# Patient Record
Sex: MALE | Race: WHITE | NOT HISPANIC OR LATINO
[De-identification: names, ages, dates, MRNs, and addresses within clinical notes are randomized per-mention and may not be internally consistent; named-entity substitution may affect disease eponyms.]

---

## 2019-01-08 PROBLEM — Z00.00 ENCOUNTER FOR PREVENTIVE HEALTH EXAMINATION: Status: ACTIVE | Noted: 2019-01-08

## 2019-01-14 ENCOUNTER — RECORD ABSTRACTING (OUTPATIENT)
Age: 54
End: 2019-01-14

## 2019-01-14 DIAGNOSIS — Z78.9 OTHER SPECIFIED HEALTH STATUS: ICD-10-CM

## 2019-01-14 DIAGNOSIS — A09 INFECTIOUS GASTROENTERITIS AND COLITIS, UNSPECIFIED: ICD-10-CM

## 2019-01-14 DIAGNOSIS — Z82.3 FAMILY HISTORY OF STROKE: ICD-10-CM

## 2019-02-01 ENCOUNTER — APPOINTMENT (OUTPATIENT)
Dept: GASTROENTEROLOGY | Facility: CLINIC | Age: 54
End: 2019-02-01
Payer: COMMERCIAL

## 2019-02-01 VITALS
WEIGHT: 170 LBS | HEIGHT: 67 IN | BODY MASS INDEX: 26.68 KG/M2 | DIASTOLIC BLOOD PRESSURE: 90 MMHG | HEART RATE: 82 BPM | SYSTOLIC BLOOD PRESSURE: 136 MMHG

## 2019-02-01 PROCEDURE — 99243 OFF/OP CNSLTJ NEW/EST LOW 30: CPT

## 2019-02-01 RX ORDER — CEPHALEXIN 500 MG/1
500 CAPSULE ORAL
Refills: 0 | Status: DISCONTINUED | COMMUNITY
End: 2019-02-01

## 2019-02-01 NOTE — ASSESSMENT
[FreeTextEntry1] : -Hx colon polyps - Colonoscopy scheduled - Risks, benefits, alternatives were discussed, including but not limited to bleeding, infection, perforation and sedation risks. Additionally, the possibility of missed lesions was conveyed.\par \par -GERD - Reviewed dietary and lifestyle modifications, including weight loss, smaller/frequent/earlier (>3h prior to lying down) meals, trials of cutting down/out caffeine, chocolate, tomatoes, fatty foods, alcohol.\par \par -LFTs - will request labs from last 2 annuals - if LFTs abnormal, will w/u , otherwise would consider ALT mild elevation at ER visit due to acute colitis epiphenomenon

## 2019-02-01 NOTE — HISTORY OF PRESENT ILLNESS
[FreeTextEntry1] : 53m  h/o nephrolithiasis, GERD for f/u\par \par Hx polyps - \par Pt denies abdominal pain, rectal bleeding, change in bowel habits, or unexplained weight loss.  \par Some BRBPR reported at wiping after hard stools.\par \par GERD - not bothersome now - diet controlled (avoids coffee, chocolate, late eating).  No dysphagia/wt loss.\par \par Last colonoscopy: 3/2014 cecal polyp\par Last EGD 3/2014 mild esophagitis (A), mild h. hernia\par \par Other hx:\par ER for colitis 2/8/14 turning bloody diarrhea with lower abdominal cramps. \par cbc, cmet unremarkable (except ALT 90s), but \par CT +IV: IMPRESSION: There is apparent wall thickening of the descending colon suspicious for a colitis. Inflammatory and/or infectious etiologies should be considered. Ischemia is unlikely in this patient without signs of atherosclerotic disease of the aorta or its branches.  There is hepatic steatosis.\par \par I had rec f/u after last visit to w/u ALT - pt reports not having an significant abnormality on routine testing since.  \par \par Soc:  no tobacco or significant EtOH\par FHx: GF w/ CRC\par \par ROS:\par Constitutional:: no weight loss, fevers\par ENT: no deafness\par Eyes: not blind\par Neck: no LN\par Chest: no dyspnea/cough\par Cardiac: no chest pain\par Vascular: no leg swelling\par GI: no abdominal pain, nausea, vomiting, diarrhea, constipation, rectal bleeding, dysphagia, melena unless otherwise noted in HPI\par : no dysuria, dark urine\par Skin: no rashes, jaundice\par Heme: no bleeding\par \par Px: (VS noted below)\par General: NAD\par Eyes: anicteric\par Oropharynx:  clear\par Neck: no LN\par Chest: normal respiratory effort\par CVS: regular\par Abd: soft, NT, ND, +BS, no HSM\par Ext: no atrophy\par Neuro: grossly nonfocal\par \par \par

## 2019-02-01 NOTE — CONSULT LETTER
[Dear  ___] : Dear  [unfilled], [Consult Letter:] : I had the pleasure of evaluating your patient, [unfilled]. [Please see my note below.] : Please see my note below. [Consult Closing:] : Thank you very much for allowing me to participate in the care of this patient.  If you have any questions, please do not hesitate to contact me. [Sincerely,] : Sincerely, [FreeTextEntry3] : Kush Cristina MD\par

## 2019-02-07 ENCOUNTER — APPOINTMENT (OUTPATIENT)
Dept: PODIATRY | Facility: CLINIC | Age: 54
End: 2019-02-07
Payer: COMMERCIAL

## 2019-02-07 VITALS
DIASTOLIC BLOOD PRESSURE: 80 MMHG | SYSTOLIC BLOOD PRESSURE: 120 MMHG | WEIGHT: 170 LBS | HEIGHT: 66 IN | BODY MASS INDEX: 27.32 KG/M2

## 2019-02-07 PROCEDURE — 11750 EXCISION NAIL&NAIL MATRIX: CPT | Mod: LT

## 2019-02-07 PROCEDURE — 99213 OFFICE O/P EST LOW 20 MIN: CPT | Mod: 25

## 2019-02-07 RX ORDER — LIDOCAINE HYDROCHLORIDE AND EPINEPHRINE BITARTRATE 20; .01 MG/ML; MG/ML
2 INJECTION, SOLUTION SUBCUTANEOUS
Qty: 0 | Refills: 0 | Status: COMPLETED | OUTPATIENT
Start: 2019-02-07

## 2019-02-07 RX ORDER — LIDOCAINE HYDROCHLORIDE 10 MG/ML
1 INJECTION, SOLUTION INFILTRATION; PERINEURAL
Qty: 0 | Refills: 0 | Status: COMPLETED | OUTPATIENT
Start: 2019-02-07

## 2019-02-07 RX ORDER — LIDOCAINE HYDROCHLORIDE 20 MG/ML
2 INJECTION, SOLUTION INFILTRATION; PERINEURAL
Qty: 0 | Refills: 0 | Status: COMPLETED | OUTPATIENT
Start: 2019-02-07

## 2019-02-07 RX ADMIN — LIDOCAINE HYDROCHLORIDE AND EPINEPHRINE BITARTRATE %-1:100000: 20; .01 INJECTION, SOLUTION SUBCUTANEOUS at 00:00

## 2019-02-07 RX ADMIN — LIDOCAINE HYDROCHLORIDE AND EPINEPHRINE %-1:100000: 10; 10 INJECTION, SOLUTION INFILTRATION; PERINEURAL at 00:00

## 2019-02-07 RX ADMIN — LIDOCAINE HYDROCHLORIDE %: 10 INJECTION, SOLUTION INFILTRATION; PERINEURAL at 00:00

## 2019-02-07 NOTE — PROCEDURE
[FreeTextEntry1] : I had a lengthy discussion with the patient from a medical assistant regarding treatment options for ingrown nails. They ranged from conservative to surgical. Conservative measures included simple nail care on a periodic basis with removal of all offending spicule and periodic care every time if and when the problem recurs. I also explained a nonpermanent removal in which Novocain would be used a portion of the old offending portion of nail would be removed and allowed to grow back in and monitor the situation. I also explained permanent removal in the form of cold steel procedures as well as the phenolization. I explained risks alternatives and benefits to all types of conservative as well as surgical approaches. Surgical approaches can be done under local anesthesia and the risks and benefits of those were stressed in a more lengthy discussion. Some of the risks included but were not limited to infection recurrence of deformity chronic pain to the area recurrence of nail growth either partially or totally and the condition could actually become worse. I explained to the patient that they would need oral as well as topical antibiotics and should they get a postop infection that is not handled by oral antibiotics and quite possibly hospitalization with intravenous antibiotics would be necessary. I also explained in some rare and unusual cases and infection could become so advanced that it involves the bone. At that point surgery to remove the bone and possibly part or all of the toe could occur. Although it is quite rare it is still a possible postoperative complications. After a lengthy discussion the patient decided to undergo phenolization verbal consent was obtained in front of my medical assistant\par Chief Complaint(s): \par   •  ingrown nail pain right great toe\par  HPI:    \par          Patient has a chronic recurring ingrown nail with granuloma formation and a history of drainage to the medial aspect of the right great toenail for many years. It is a chronic problem and past treatments have included self and professional treatment for over 5 years however this has not been successful in curing the problem. Now it is a constant throbbing pain with a history of draining for over 2 weeks. The patient presents stating he knows that there is a way to permanently remove the nail and would like to discuss the treatment as prior treatments of routine debridements and partial removal of nail spicules have not solved the problem.\par \par ROS: \par      FJL ROS\par         Constitutional  No chills, No fever.  Cardiovascular  denies high blood pressure, heart disease and high cholesterol.  Respiratory  denies shortness of breath, denies shortness of breath asthma and bronchitis.  Musculoskeletal  denies joint swelling or immobility, other than the area of chief complaint.  Neurologic  denies weakness, denies numbness tingling sharp shooting pains.  Endocrine  denies diabetes, hypothyroidism, cuts taking longer to heal, extreme thirst, and frequent urination.  Hematologic/Lymphatic  Denies bleeding, denies bruising, denies lymphadenopathy, denies anemia.  Gastroenterology  denies GI bleeding, denies stomach ulcers, hiatal hernias, reflux, gastritis.  Skin  Denies rashes, denies color changes, denies itching, denies interdigital maceration or problems with chronic itch to either lower extremity ankle or foot.  Psych  denies anxiety and depression. \par \par Examination:    \par      FJL Exam\par         Signs of neglect or abuse? No.  \par         Peripheral pulses Right posterior tibial pulse, Left posterior tibial pulse, Right dorsalis pedis, Left dorsalis pedis, full equal and regular, no evidence of vascular disease.  \par         Capillary refill time (seconds) <3 seconds.  \par         Peripheral Circulation No cyanosis or clubbing, No edema.  \par         Right lower extremity no evidence of weakness, atrohy or wasting.  \par         Left lower extremity no evidence of weakness atrophy or wasting.  \par         Gait mildly antalgic favoring, right side.  \par         Fall Risk Not at risk for fall.  \par         Ingrown toenail (s) Findings include mild surrounding inflammation but no evidence of acute or active infectious process., no pus no cellulitis., there is pain upon palpation of the entire side of the affected nail from the tip back to the cuticle, right toenail #1, MEDIAL border.  \par         Skin inspection No rashes, No dysplastic lesions, web spaces are clear, no sign of subcutaneous nodules skin lesions or ulcers.  \par         Sensation sharp/dull, light touch, proprioception and 5.07 monofilament wire tests are all WNL.  \par         Mental Status Judement/Insight normal, Oriented x3.  \par         Heme/Lymph no sign of bruising or bleeding and there was no lymphangitis and lymphadenopathy.  \par         Neurologic Sharp dull sensation is normal, proprioception is normal, light touch is normal plantar responses flexion and withdrawal, and gross sensorium appears intact.   \par   \par \par Assessment: \par    Assessment: \par   •  Paronychia of great toe, right- L03.031 (Primary) \par   •  Pyogenic granuloma - L98.0 \par \par \par Plan: \par   Treatment:\par Podiatry Procedure\par        Nail Plate Avulsion Partial Permanent After risks, alternatives and benefits were explained to the patient and verbal consent obtained, the surgical site on the medial aspect of the medial left great toenail was cleansed with alcohol, painted with Betadine and injected with 3 cc's of 1 percent lidocaine 1:200,000, Next using sterile instrumentation a partial nail plate avulsion was done to the affected border and next a phenol swab was inserted to the area for 60 seconds, The surgical site was then irrigated with copious amounts of alcohol and a dry sterile bandage was applied using Silvadene , The patient received written and oral discharge instructions as well as postoperative aftercare orders, A prescription for topical as well as oral antibiotics was given, A follow up appointment was given with instructions to notify the office if there are any questions  \par \par \par

## 2019-02-07 NOTE — HISTORY OF PRESENT ILLNESS
[FreeTextEntry1] : The area of maximum pain in his left great toe specifically the medial aspect it is an acute exacerbation of a chronic condition patient has had a nonpermanent nail plate intervention done in the past he has also tried self treatment without relief and he presents today to discuss more permanent treatment options

## 2019-02-07 NOTE — PHYSICAL EXAM
[General Appearance - Alert] : alert [General Appearance - In No Acute Distress] : in no acute distress [Nails Ingrowing Foot Left First Toe] : ingrown [Tenderness On Palpation First Toe Left Nail Bed] : tender [___] : [unfilled] [Abnormal Walk] : normal gait [Nail Clubbing] : no clubbing  or cyanosis of the fingernails [Musculoskeletal - Swelling] : no joint swelling seen [Motor Tone] : muscle strength and tone were normal [Skin Color & Pigmentation] : normal skin color and pigmentation [Skin Turgor] : normal skin turgor [] : no rash [Deep Tendon Reflexes (DTR)] : deep tendon reflexes were 2+ and symmetric [Sensation] : the sensory exam was normal to light touch and pinprick [No Focal Deficits] : no focal deficits [Oriented To Time, Place, And Person] : oriented to person, place, and time [Impaired Insight] : insight and judgment were intact [Affect] : the affect was normal [Toenails Thickening] : not hypertrophied [Toenails Discoloration] : normal colored [Nails Subungual Debris] : without subungal debris

## 2019-02-11 ENCOUNTER — APPOINTMENT (OUTPATIENT)
Dept: GASTROENTEROLOGY | Facility: HOSPITAL | Age: 54
End: 2019-02-11

## 2019-02-15 ENCOUNTER — APPOINTMENT (OUTPATIENT)
Dept: PODIATRY | Facility: CLINIC | Age: 54
End: 2019-02-15
Payer: COMMERCIAL

## 2019-02-15 VITALS
WEIGHT: 170 LBS | DIASTOLIC BLOOD PRESSURE: 70 MMHG | HEIGHT: 66 IN | BODY MASS INDEX: 27.32 KG/M2 | SYSTOLIC BLOOD PRESSURE: 122 MMHG

## 2019-02-15 PROCEDURE — 99024 POSTOP FOLLOW-UP VISIT: CPT

## 2019-02-15 RX ORDER — CEPHALEXIN 500 MG/1
500 CAPSULE ORAL 3 TIMES DAILY
Qty: 21 | Refills: 1 | Status: COMPLETED | COMMUNITY
Start: 2019-02-07 | End: 2019-02-20

## 2019-02-15 NOTE — PROCEDURE
[FreeTextEntry1] : the evaluation of the surgical site shows good healing no evidence of wound breakdown no evidence of dehiscence no fluctuance no increased heat no sign of postoperative infection.\par Any loose fibrinous debris was removed with a sterile curette to reveal a good healthy nailbed the area was dressed with Silvadene and a dry sterile bandage postop after care and wound care was reviewed with the patient\par Vascular exam reveals palpable pedal pulses, the foot is warm to touch, there was good capillary fill time, the skin is normal in appearance there is no evidence of vascular disease or vascular compromise at this time.\par I have reviewed the care orders with the patient they understand and they also understands the long-term consequences of not following my wound care orders as well  precautions I have recommended  and shoe gear advice I have mentioned.\par

## 2019-02-15 NOTE — REASON FOR VISIT
[Post Op: _________] : a [unfilled] post op visit [FreeTextEntry1] : Postop phenol procedure medial aspect left great toe

## 2019-02-15 NOTE — HISTORY OF PRESENT ILLNESS
[FreeTextEntry1] : One week postop has residual pain and inflammation denies fever chills nausea vomiting has been taking his oral antibiotic and applying topical cream as directed

## 2019-03-07 ENCOUNTER — APPOINTMENT (OUTPATIENT)
Dept: PODIATRY | Facility: CLINIC | Age: 54
End: 2019-03-07

## 2020-11-06 ENCOUNTER — APPOINTMENT (OUTPATIENT)
Dept: GASTROENTEROLOGY | Facility: CLINIC | Age: 55
End: 2020-11-06
Payer: COMMERCIAL

## 2020-11-06 VITALS
HEART RATE: 84 BPM | WEIGHT: 175 LBS | DIASTOLIC BLOOD PRESSURE: 72 MMHG | HEIGHT: 66 IN | OXYGEN SATURATION: 98 % | TEMPERATURE: 97.2 F | RESPIRATION RATE: 16 BRPM | BODY MASS INDEX: 28.12 KG/M2 | SYSTOLIC BLOOD PRESSURE: 118 MMHG

## 2020-11-06 PROCEDURE — 99214 OFFICE O/P EST MOD 30 MIN: CPT

## 2020-11-06 PROCEDURE — 99072 ADDL SUPL MATRL&STAF TM PHE: CPT

## 2020-11-06 NOTE — HISTORY OF PRESENT ILLNESS
[FreeTextEntry1] : 57m  h/o nephrolithiasis, GERD for f/u - gerd -\par \par Worsening heartburn over last 6months during covid - not eating as well.  +Wt gain.  Occ sensation of food sticking - solid and liquids.  Taking TUMS daily - some help.  Severe sx at times, worse at night.\par \par Last EGD 3/2014 mild esophagitis (A), mild h. hernia\par \par Last colonoscopy: \par 2/2019 colon polyps - 5y\par \par Other hx:\par ER for colitis 2/8/14 turning bloody diarrhea with lower abdominal cramps. \par cbc, cmet unremarkable (except ALT 90s), but \par CT +IV: IMPRESSION: There is apparent wall thickening of the descending colon suspicious for a colitis. Inflammatory and/or infectious etiologies should be considered. Ischemia is unlikely in this patient without signs of atherosclerotic disease of the aorta or its branches.  There is hepatic steatosis.\par \par Soc:  no tobacco or significant EtOH\par FHx: GF w/ CRC\par \par ROS:\par Constitutional:: no weight loss, fevers\par ENT: no deafness\par Eyes: not blind\par Neck: no LN\par Chest: no dyspnea/cough\par Cardiac: no chest pain\par Vascular: no leg swelling\par GI: no abdominal pain, nausea, vomiting, diarrhea, constipation, rectal bleeding, dysphagia, melena unless otherwise noted in HPI\par : no dysuria, dark urine\par Skin: no rashes, jaundice\par Heme: no bleeding\par \par Px: (VS noted below)\par General: NAD\par Eyes: anicteric\par Oropharynx:  clear\par Neck: no LN\par Chest: normal respiratory effort\par CVS: regular\par Abd: soft, NT, ND, +BS, no HSM\par Ext: no atrophy\par Neuro: grossly nonfocal\par \par \par

## 2020-11-06 NOTE — ASSESSMENT
[FreeTextEntry1] : -GERD - Reviewed dietary and lifestyle modifications, including weight loss, smaller/frequent/earlier (>3h prior to lying down) meals, trials of cutting down/out caffeine, chocolate, tomatoes, fatty foods, alcohol.\par Rx omeprazole trial.  Will plan EGD (poss dilation?) in light of hx erosive change and now ?dysphagia.\par \par --Hx colon polyps - Colon cancer screening - colonoscopy due in 2/2024

## 2020-12-18 ENCOUNTER — RESULT REVIEW (OUTPATIENT)
Age: 55
End: 2020-12-18

## 2020-12-20 ENCOUNTER — RESULT REVIEW (OUTPATIENT)
Age: 55
End: 2020-12-20

## 2020-12-21 ENCOUNTER — APPOINTMENT (OUTPATIENT)
Dept: GASTROENTEROLOGY | Facility: HOSPITAL | Age: 55
End: 2020-12-21

## 2021-02-03 ENCOUNTER — RX RENEWAL (OUTPATIENT)
Age: 56
End: 2021-02-03

## 2021-05-19 ENCOUNTER — RX RENEWAL (OUTPATIENT)
Age: 56
End: 2021-05-19

## 2021-12-15 ENCOUNTER — RX RENEWAL (OUTPATIENT)
Age: 56
End: 2021-12-15

## 2022-02-22 ENCOUNTER — NON-APPOINTMENT (OUTPATIENT)
Age: 57
End: 2022-02-22

## 2022-02-22 DIAGNOSIS — L03.032 CELLULITIS OF LEFT TOE: ICD-10-CM

## 2022-02-22 DIAGNOSIS — Z86.010 PERSONAL HISTORY OF COLONIC POLYPS: ICD-10-CM

## 2022-02-22 DIAGNOSIS — R94.5 ABNORMAL RESULTS OF LIVER FUNCTION STUDIES: ICD-10-CM

## 2022-02-22 DIAGNOSIS — Z86.61 PERSONAL HISTORY OF INFECTIONS OF THE CENTRAL NERVOUS SYSTEM: ICD-10-CM

## 2022-02-22 DIAGNOSIS — M79.672 PAIN IN LEFT FOOT: ICD-10-CM

## 2022-02-22 DIAGNOSIS — Z87.442 PERSONAL HISTORY OF URINARY CALCULI: ICD-10-CM

## 2022-02-23 ENCOUNTER — APPOINTMENT (OUTPATIENT)
Dept: CARDIOLOGY | Facility: CLINIC | Age: 57
End: 2022-02-23
Payer: COMMERCIAL

## 2022-02-23 ENCOUNTER — NON-APPOINTMENT (OUTPATIENT)
Age: 57
End: 2022-02-23

## 2022-02-23 VITALS
TEMPERATURE: 97 F | DIASTOLIC BLOOD PRESSURE: 80 MMHG | SYSTOLIC BLOOD PRESSURE: 135 MMHG | BODY MASS INDEX: 23.78 KG/M2 | WEIGHT: 148 LBS | HEIGHT: 66 IN

## 2022-02-23 DIAGNOSIS — Z87.438 PERSONAL HISTORY OF OTHER DISEASES OF MALE GENITAL ORGANS: ICD-10-CM

## 2022-02-23 DIAGNOSIS — E78.1 PURE HYPERGLYCERIDEMIA: ICD-10-CM

## 2022-02-23 PROCEDURE — 99205 OFFICE O/P NEW HI 60 MIN: CPT

## 2022-02-23 PROCEDURE — 36415 COLL VENOUS BLD VENIPUNCTURE: CPT

## 2022-02-23 PROCEDURE — 93000 ELECTROCARDIOGRAM COMPLETE: CPT

## 2022-02-23 NOTE — HISTORY OF PRESENT ILLNESS
[FreeTextEntry1] : Mr jones was here in 2013 was seen in 2013 for chest pain and hypertriglyceridemia, a stress test was normal.-\par He felt better.\par \par RECORDS from [facility] retrieved, reviewed, scanned.\par \par A few month ago symptoms recurred.\par \par Most recently  yesterday while refinishing furniture he felt a knot in his mid chest , through to back and the right shoulder blade, he has to hunch forward,lasted 4-5 hours , resolved spontaneously.\par He gets them daily, he walks 4 miles a day without this discomfort.\par He denies  dyspnea, palpitations or syncope.\par \par He does intermittent fasting he eats once a day.\par He is stress, his daughter is a senior in , he is planning to move with her.\par

## 2022-02-23 NOTE — CARDIOLOGY SUMMARY
[de-identified] : 2/23/22-rebeca de la garza [de-identified] : Stress test 3/15/13 [id number] no evidenceof exercise induced ischemia, fair fucntional capacity

## 2022-02-24 LAB
ALBUMIN SERPL ELPH-MCNC: 4.4 G/DL
ALP BLD-CCNC: 86 U/L
ALT SERPL-CCNC: 28 U/L
AMYLASE/CREAT SERPL: 47 U/L
ANION GAP SERPL CALC-SCNC: 14 MMOL/L
AST SERPL-CCNC: 32 U/L
BASOPHILS # BLD AUTO: 0.03 K/UL
BASOPHILS NFR BLD AUTO: 0.9 %
BILIRUB SERPL-MCNC: 0.5 MG/DL
BUN SERPL-MCNC: 8 MG/DL
CALCIUM SERPL-MCNC: 9.1 MG/DL
CHLORIDE SERPL-SCNC: 107 MMOL/L
CHOLEST SERPL-MCNC: 166 MG/DL
CO2 SERPL-SCNC: 21 MMOL/L
CREAT SERPL-MCNC: 0.95 MG/DL
EOSINOPHIL # BLD AUTO: 0.14 K/UL
EOSINOPHIL NFR BLD AUTO: 4.1 %
GLUCOSE SERPL-MCNC: 99 MG/DL
HCT VFR BLD CALC: 47 %
HDLC SERPL-MCNC: 48 MG/DL
HGB BLD-MCNC: 14.5 G/DL
IMM GRANULOCYTES NFR BLD AUTO: 0.3 %
LDLC SERPL CALC-MCNC: 100 MG/DL
LPL SERPL-CCNC: 31 U/L
LYMPHOCYTES # BLD AUTO: 1.09 K/UL
LYMPHOCYTES NFR BLD AUTO: 32 %
MAN DIFF?: NORMAL
MCHC RBC-ENTMCNC: 27.7 PG
MCHC RBC-ENTMCNC: 30.9 GM/DL
MCV RBC AUTO: 89.7 FL
MONOCYTES # BLD AUTO: 0.37 K/UL
MONOCYTES NFR BLD AUTO: 10.9 %
NEUTROPHILS # BLD AUTO: 1.77 K/UL
NEUTROPHILS NFR BLD AUTO: 51.8 %
NONHDLC SERPL-MCNC: 117 MG/DL
PLATELET # BLD AUTO: 153 K/UL
POTASSIUM SERPL-SCNC: 4.6 MMOL/L
PROT SERPL-MCNC: 6.5 G/DL
RBC # BLD: 5.24 M/UL
RBC # FLD: 14.8 %
SODIUM SERPL-SCNC: 142 MMOL/L
TRIGL SERPL-MCNC: 88 MG/DL
WBC # FLD AUTO: 3.41 K/UL

## 2022-03-11 ENCOUNTER — APPOINTMENT (OUTPATIENT)
Dept: GASTROENTEROLOGY | Facility: CLINIC | Age: 57
End: 2022-03-11
Payer: COMMERCIAL

## 2022-03-11 VITALS
TEMPERATURE: 97.6 F | BODY MASS INDEX: 22.82 KG/M2 | SYSTOLIC BLOOD PRESSURE: 128 MMHG | OXYGEN SATURATION: 96 % | WEIGHT: 142 LBS | RESPIRATION RATE: 16 BRPM | HEART RATE: 78 BPM | DIASTOLIC BLOOD PRESSURE: 76 MMHG | HEIGHT: 66 IN

## 2022-03-11 DIAGNOSIS — K59.00 CONSTIPATION, UNSPECIFIED: ICD-10-CM

## 2022-03-11 DIAGNOSIS — R07.9 CHEST PAIN, UNSPECIFIED: ICD-10-CM

## 2022-03-11 DIAGNOSIS — K21.9 GASTRO-ESOPHAGEAL REFLUX DISEASE W/OUT ESOPHAGITIS: ICD-10-CM

## 2022-03-11 DIAGNOSIS — K64.9 UNSPECIFIED HEMORRHOIDS: ICD-10-CM

## 2022-03-11 DIAGNOSIS — R19.4 CHANGE IN BOWEL HABIT: ICD-10-CM

## 2022-03-11 DIAGNOSIS — K22.70 BARRETT'S ESOPHAGUS W/OUT DYSPLASIA: ICD-10-CM

## 2022-03-11 PROCEDURE — 99214 OFFICE O/P EST MOD 30 MIN: CPT

## 2022-03-11 RX ORDER — LINACLOTIDE 145 UG/1
145 CAPSULE, GELATIN COATED ORAL
Qty: 30 | Refills: 2 | Status: ACTIVE | COMMUNITY
Start: 2022-03-11 | End: 1900-01-01

## 2022-03-11 RX ORDER — HYDROCORTISONE 25 MG/G
2.5 CREAM TOPICAL 3 TIMES DAILY
Qty: 1 | Refills: 1 | Status: ACTIVE | COMMUNITY
Start: 2022-03-11 | End: 1900-01-01

## 2022-03-11 NOTE — HISTORY OF PRESENT ILLNESS
[FreeTextEntry1] : 56m  h/o nephrolithiasis, GERD, short segment Barretts, here for constipation.\par \par W/O change in diet/routine went from baseline BM q3d, to q5-7d in last 6mo.  \par Tried miralax - was using up to 3-4x/d w/o rlief - had to use dulcolax.  Feels lower abd bloating day 5-7.  No n/v/fever/bleeding.  Does have hemorrhoids more this year  - some bulging intermittently - not today.\par \par Had CP episode radiating to back -nonexertional - positional - nonprandial - saw Dr. Reilly - doing w/u but suspects GI - echo and CT angiography pending.\par \par 2.2022 cbc, cmet, lipase unrevealing.\par \par GERD: - noncompliant w/ ppi b/c GERD improved.   No dysphagia\par \par EGD 3/2014 mild esophagitis (A), mild h. hernia\par EGD 12/2020 EGD for GERD,dysphagia - short segment Rios's - nondysplastic.\par \par Last colonoscopy: \par 2/2019 colon polyps - 5y\par \par Other hx:\par ER for colitis 2/8/14 turning bloody diarrhea with lower abdominal cramps. \par cbc, cmet unremarkable (except ALT 90s), but \par CT +IV: IMPRESSION: There is apparent wall thickening of the descending colon suspicious for a colitis. Inflammatory and/or infectious etiologies should be considered. Ischemia is unlikely in this patient without signs of atherosclerotic disease of the aorta or its branches.  There is hepatic steatosis.\par \par Soc:  no tobacco or significant EtOH\par FHx: GF w/ CRC\par \par ROS:\par Constitutional:: no weight loss, fevers\par ENT: no deafness\par Eyes: not blind\par Neck: no LN\par Chest: no dyspnea/cough\par Cardiac: no chest pain\par Vascular: no leg swelling\par GI: no abdominal pain, nausea, vomiting, diarrhea, constipation, rectal bleeding, dysphagia, melena unless otherwise noted in HPI\par : no dysuria, dark urine\par Skin: no rashes, jaundice\par Heme: no bleeding\par \par Px: (VS noted below)\par General: NAD\par Eyes: anicteric\par Oropharynx:  clear\par Neck: no LN\par Chest: normal respiratory effort\par CVS: regular\par Abd: soft, NT, ND, +BS, no HSM\par Ext: no atrophy\par Neuro: grossly nonfocal\par \par \par

## 2022-03-11 NOTE — ASSESSMENT
[FreeTextEntry1] : -change in BM, Constipation - reviewed dietary and lifestyle modifications, including increased fluid, fiber intake, as well as habituation / following urge to defecate, cutting back on bread/pasta, and to cont miralax, but will start linzess as well. Colonoscopy as this pattern represents a significant change - will defer until after ischemic w/u.\par -hemorrhoids- will give topical rx\par -GERD -resume ppi - see if helps w/ sx and should be on for almaguer's\par -chest pain - ischemic w/u w/ Dr. Reilly.  If neg, might consider repeat EGD but doubt would be diagnostic  Reviewed dietary and lifestyle modifications, including weight loss, smaller/frequent/earlier (>3h prior to lying down) meals, trials of cutting down/out caffeine, chocolate, tomatoes, fatty foods, alcohol.\par --Hx colon polyps - Colon cancer screening - colonoscopy due in 2/2024 - will do sooner as above

## 2022-03-28 ENCOUNTER — APPOINTMENT (OUTPATIENT)
Dept: CARDIOLOGY | Facility: CLINIC | Age: 57
End: 2022-03-28
Payer: COMMERCIAL

## 2022-03-28 DIAGNOSIS — I77.810 THORACIC AORTIC ECTASIA: ICD-10-CM

## 2022-03-28 PROCEDURE — 93306 TTE W/DOPPLER COMPLETE: CPT

## 2022-03-30 PROBLEM — I77.810 DILATED AORTIC ROOT: Status: ACTIVE | Noted: 2022-03-30

## 2022-04-12 RX ORDER — METOPROLOL TARTRATE 50 MG/1
50 TABLET, FILM COATED ORAL
Qty: 3 | Refills: 0 | Status: COMPLETED | COMMUNITY
Start: 2022-04-12 | End: 2022-05-11

## 2022-04-20 ENCOUNTER — RESULT REVIEW (OUTPATIENT)
Age: 57
End: 2022-04-20

## 2022-08-24 ENCOUNTER — APPOINTMENT (OUTPATIENT)
Dept: CARDIOLOGY | Facility: CLINIC | Age: 57
End: 2022-08-24